# Patient Record
Sex: FEMALE | Race: WHITE | Employment: FULL TIME | URBAN - METROPOLITAN AREA
[De-identification: names, ages, dates, MRNs, and addresses within clinical notes are randomized per-mention and may not be internally consistent; named-entity substitution may affect disease eponyms.]

---

## 2021-04-04 NOTE — H&P
[address] [facility]  Obstetrics and Gynecology   History & Physical  NEW    Chief complaint: Patient presents with:  Physical: WWE, last pap and marilu 2019    Subjective:     HPI: Isabel Valverde is a 54year old [id number] with LMP No LMP recorded.     Here for unexpected weight change. Gaining weight during [place] pandemic. Diet is not too bad but hard to exercise well because of prone to injury - low back & knee pain. Eyes: Negative for visual disturbance.         Wears glasses    Respiratory: Negative Oral Tab, valacyclovir 1 gram tablet   Take 1 tablet every day by oral route., Disp: , Rfl:     No current facility-administered medications on file prior to visit.       All:    Bee Venom               SWELLING    Comment:Foot swelling    PMH:  Past [employer] Socioeconomic History      Marital status: Single      Spouse name: Not on file      Number of children: Not on file      Years of education: Not on file      Highest education level: Not on file    Occupational History      Not on file    Tobacco Use Abused:       Sexually Abused:      Family History:  Family History   Problem Relation Age of Onset   • Genetic Disease Son         [place] Muscular Dystrophy   • Genetic Disease Son         [place] Muscular Dystrophy   • Substance Abuse Mother    • Hear normal. Physiologic discharge. Normal cervix. Normal sized, anteverted, mobile, non-tender uterus. No uterosacral ligament nodularity. No adnexal masses or tenderness. No cervical motion tenderness.     Musculoskeletal:      Comments: Extremities non-tender ablation  -     FSH; Future    Dilated aortic root (HCC)    Vaginal spotting  -     FSH; Future    Screen for STD (sexually transmitted disease)  -     CHLAMYDIA/GONOCOCCUS, CYRUS;  Future         Plan:     Pap & HPV done   Breast exam benign   Mammogram Rx

## 2021-04-05 ENCOUNTER — OFFICE VISIT (OUTPATIENT)
Dept: OBGYN CLINIC | Facility: CLINIC | Age: 51
End: 2021-04-05
Payer: COMMERCIAL

## 2021-04-05 ENCOUNTER — LAB ENCOUNTER (OUTPATIENT)
Dept: LAB | Facility: HOSPITAL | Age: 51
End: 2021-04-05
Attending: OBSTETRICS & GYNECOLOGY
Payer: COMMERCIAL

## 2021-04-05 VITALS
BODY MASS INDEX: 27.08 KG/M2 | HEIGHT: 70 IN | DIASTOLIC BLOOD PRESSURE: 58 MMHG | RESPIRATION RATE: 16 BRPM | HEART RATE: 98 BPM | WEIGHT: 189.13 LBS | SYSTOLIC BLOOD PRESSURE: 118 MMHG

## 2021-04-05 DIAGNOSIS — Z12.31 ENCOUNTER FOR SCREENING MAMMOGRAM FOR MALIGNANT NEOPLASM OF BREAST: ICD-10-CM

## 2021-04-05 DIAGNOSIS — Z01.411 ENCOUNTER FOR WELL WOMAN EXAM WITH ABNORMAL FINDINGS: Primary | ICD-10-CM

## 2021-04-05 DIAGNOSIS — N93.9 VAGINAL SPOTTING: ICD-10-CM

## 2021-04-05 DIAGNOSIS — I77.810 DILATED AORTIC ROOT (HCC): ICD-10-CM

## 2021-04-05 DIAGNOSIS — Z12.4 SCREENING FOR CERVICAL CANCER: ICD-10-CM

## 2021-04-05 DIAGNOSIS — Z30.09 GENERAL COUNSELING AND ADVICE FOR CONTRACEPTIVE MANAGEMENT: ICD-10-CM

## 2021-04-05 DIAGNOSIS — Z11.3 SCREEN FOR STD (SEXUALLY TRANSMITTED DISEASE): ICD-10-CM

## 2021-04-05 DIAGNOSIS — Z98.890 HISTORY OF ENDOMETRIAL ABLATION: ICD-10-CM

## 2021-04-05 PROCEDURE — 87624 HPV HI-RISK TYP POOLED RSLT: CPT | Performed by: OBSTETRICS & GYNECOLOGY

## 2021-04-05 PROCEDURE — 88175 CYTOPATH C/V AUTO FLUID REDO: CPT | Performed by: OBSTETRICS & GYNECOLOGY

## 2021-04-05 PROCEDURE — 87625 HPV TYPES 16 & 18 ONLY: CPT | Performed by: OBSTETRICS & GYNECOLOGY

## 2021-04-05 PROCEDURE — 87591 N.GONORRHOEAE DNA AMP PROB: CPT | Performed by: OBSTETRICS & GYNECOLOGY

## 2021-04-05 PROCEDURE — 83001 ASSAY OF GONADOTROPIN (FSH): CPT

## 2021-04-05 PROCEDURE — 36415 COLL VENOUS BLD VENIPUNCTURE: CPT

## 2021-04-05 PROCEDURE — 87491 CHLMYD TRACH DNA AMP PROBE: CPT | Performed by: OBSTETRICS & GYNECOLOGY

## 2021-04-05 PROCEDURE — 3078F DIAST BP <80 MM HG: CPT | Performed by: OBSTETRICS & GYNECOLOGY

## 2021-04-05 PROCEDURE — 99386 PREV VISIT NEW AGE 40-64: CPT | Performed by: OBSTETRICS & GYNECOLOGY

## 2021-04-05 PROCEDURE — 3008F BODY MASS INDEX DOCD: CPT | Performed by: OBSTETRICS & GYNECOLOGY

## 2021-04-05 PROCEDURE — 3074F SYST BP LT 130 MM HG: CPT | Performed by: OBSTETRICS & GYNECOLOGY

## 2021-04-05 RX ORDER — CYCLOBENZAPRINE HCL 5 MG
TABLET ORAL
COMMUNITY
Start: 2020-01-10

## 2021-04-05 RX ORDER — LOSARTAN POTASSIUM 50 MG/1
TABLET ORAL
COMMUNITY
Start: 2018-04-23

## 2021-04-05 RX ORDER — VALACYCLOVIR HYDROCHLORIDE 1 G/1
TABLET, FILM COATED ORAL
COMMUNITY
Start: 2018-11-10

## 2021-04-05 RX ORDER — NORELGESTROMIN AND ETHINYL ESTRADIOL 150; 35 UG/D; UG/D
1 PATCH TRANSDERMAL WEEKLY
Qty: 9 PATCH | Refills: 3 | Status: SHIPPED | OUTPATIENT
Start: 2021-04-05

## 2021-04-05 RX ORDER — METOPROLOL SUCCINATE 25 MG/1
25 TABLET, EXTENDED RELEASE ORAL DAILY
COMMUNITY
Start: 2020-03-09

## 2021-04-05 RX ORDER — NORELGESTROMIN AND ETHINYL ESTRADIOL 150; 35 UG/D; UG/D
PATCH TRANSDERMAL
COMMUNITY
Start: 2020-02-22 | End: 2021-04-05

## 2021-04-06 PROBLEM — R79.89 HIGH SERUM FOLLICLE STIMULATING HORMONE (FSH): Status: ACTIVE | Noted: 2021-04-06

## 2021-04-06 PROBLEM — N93.9 ABNORMAL UTERINE BLEEDING (AUB): Status: ACTIVE | Noted: 2021-04-06

## 2021-04-12 PROBLEM — R87.810 CERVICAL HIGH RISK HUMAN PAPILLOMAVIRUS (HPV) DNA TEST POSITIVE: Status: ACTIVE | Noted: 2021-04-05

## 2021-04-15 NOTE — PROGRESS NOTES
Patient aware of results and following recommendations:  \"FSH in postmenopausal level. Will order pelvic ultrasound. Recheck FSH in 2-4 wk. Will likely need endometrial biopsy vs hysteroscopy/D&C (h/o endometrial ablation. )\"    Patient will call to sched

## 2021-04-19 NOTE — PROGRESS NOTES
Pt advised of results and recommendations for have pelvic US done as previously advised. Then consider hysteroscopy, D&C, possible polypectomy, and LEEP under anesthesia in OR. Understanding verbalized.  Schedule is busy with her children who have muscular d

## 2021-05-14 ENCOUNTER — HOSPITAL ENCOUNTER (OUTPATIENT)
Dept: ULTRASOUND IMAGING | Age: 51
Discharge: HOME | End: 2021-05-14
Attending: OBSTETRICS & GYNECOLOGY
Payer: COMMERCIAL

## 2021-05-14 DIAGNOSIS — Z98.890 HISTORY OF ENDOMETRIAL ABLATION: ICD-10-CM

## 2021-05-14 DIAGNOSIS — R79.89 HIGH SERUM FOLLICLE STIMULATING HORMONE (FSH): ICD-10-CM

## 2021-05-14 DIAGNOSIS — N93.9 ABNORMAL UTERINE BLEEDING (AUB): ICD-10-CM

## 2021-05-14 PROCEDURE — 76830 TRANSVAGINAL US NON-OB: CPT | Performed by: OBSTETRICS & GYNECOLOGY

## 2021-05-14 PROCEDURE — 76856 US EXAM PELVIC COMPLETE: CPT | Performed by: OBSTETRICS & GYNECOLOGY

## 2021-05-17 PROBLEM — N83.201 RIGHT OVARIAN CYST: Status: ACTIVE | Noted: 2021-05-15

## 2021-05-17 PROBLEM — N83.201 RIGHT OVARIAN CYST: Status: ACTIVE | Noted: 2021-05-14

## 2021-06-04 ENCOUNTER — TELEPHONE (OUTPATIENT)
Dept: OBGYN CLINIC | Facility: CLINIC | Age: 51
End: 2021-06-04

## 2021-06-10 NOTE — TELEPHONE ENCOUNTER
Rosangela from [facility] [employer] is requesting copy of ultrasound results. Instructed Rosangela to fax over request with patient's signed release before any records can be released.

## 2021-06-10 NOTE — TELEPHONE ENCOUNTER
Rosangela from [facility] called in requesting if US results could be faxed to [phone] once patient completes.      Rosangela call back number:[phone] extension [phone]

## 2021-10-18 NOTE — TELEPHONE ENCOUNTER
Release of medical records form was faxed to [facility] @  [phone] attn:Tressa

## 2022-08-30 ENCOUNTER — TELEPHONE (OUTPATIENT)
Dept: OBGYN CLINIC | Facility: CLINIC | Age: 52
End: 2022-08-30

## 2022-08-30 NOTE — TELEPHONE ENCOUNTER
Per request for release of medical records. Medical records ws faxed to Sammie Valente @ [phone].  # [phone].